# Patient Record
Sex: FEMALE | Race: OTHER | NOT HISPANIC OR LATINO
[De-identification: names, ages, dates, MRNs, and addresses within clinical notes are randomized per-mention and may not be internally consistent; named-entity substitution may affect disease eponyms.]

---

## 2019-11-01 PROBLEM — Z00.00 ENCOUNTER FOR PREVENTIVE HEALTH EXAMINATION: Status: ACTIVE | Noted: 2019-11-01

## 2019-11-18 ENCOUNTER — APPOINTMENT (OUTPATIENT)
Dept: OBGYN | Facility: CLINIC | Age: 49
End: 2019-11-18
Payer: MEDICAID

## 2019-11-18 VITALS
BODY MASS INDEX: 27.95 KG/M2 | DIASTOLIC BLOOD PRESSURE: 78 MMHG | WEIGHT: 142.38 LBS | HEIGHT: 60 IN | SYSTOLIC BLOOD PRESSURE: 117 MMHG

## 2019-11-18 DIAGNOSIS — Z72.3 LACK OF PHYSICAL EXERCISE: ICD-10-CM

## 2019-11-18 DIAGNOSIS — N92.0 EXCESSIVE AND FREQUENT MENSTRUATION WITH REGULAR CYCLE: ICD-10-CM

## 2019-11-18 DIAGNOSIS — Z78.9 OTHER SPECIFIED HEALTH STATUS: ICD-10-CM

## 2019-11-18 DIAGNOSIS — Z83.3 FAMILY HISTORY OF DIABETES MELLITUS: ICD-10-CM

## 2019-11-18 DIAGNOSIS — Z01.419 ENCOUNTER FOR GYNECOLOGICAL EXAMINATION (GENERAL) (ROUTINE) W/OUT ABNORMAL FINDINGS: ICD-10-CM

## 2019-11-18 DIAGNOSIS — Z82.49 FAMILY HISTORY OF ISCHEMIC HEART DISEASE AND OTHER DISEASES OF THE CIRCULATORY SYSTEM: ICD-10-CM

## 2019-11-18 PROCEDURE — 99213 OFFICE O/P EST LOW 20 MIN: CPT | Mod: 25

## 2019-11-18 PROCEDURE — 99396 PREV VISIT EST AGE 40-64: CPT

## 2019-11-18 NOTE — HISTORY OF PRESENT ILLNESS
[1 Year Ago] : 1 year ago [Good] : being in good health [Healthy Diet] : a healthy diet [Last Colonoscopy ___] : Last colonoscopy [unfilled] [Last Mammogram ___] : Last Mammogram was [unfilled] [Last Pap ___] : Last cervical pap smear was [unfilled] [Reproductive Age] : is of reproductive age [Contraception] : uses contraception [Tubal Occlusion] : has had a tubal occlusion [Normal Amount/Duration] : was of a normal amount and duration [Definite:  ___ (Date)] : the last menstrual period was [unfilled] [Regular Cycle Intervals] : periods have been regular [Frequency: Q ___ days] : menstrual periods occur approximately every [unfilled] days [Sexually Active] : is sexually active [Monogamous] : is monogamous [Male ___] : [unfilled] male [Menarche Age: ____] : age at menarche was [unfilled] [Menstrual Problems] : reports normal menses [HIV] : HIV - [HSV] : HSV - [RPR] : RPR - [Hepatitis] : Hepatitis - [Chlamydia] : Chlamydia - [Gonorrhea] : Gonorrhea - [HPV] : HPV - [Spotting Between  Menses] : no spotting between menses [Menstrual Cramps] : no menstrual cramps [de-identified] :  x 6 months

## 2019-11-18 NOTE — CHIEF COMPLAINT
[Annual Visit] : annual visit [FreeTextEntry1] : 47 yo P 3003 LMP 10/26/2019 presents for annual Gyn. She has no complaints.

## 2019-11-18 NOTE — PHYSICAL EXAM
[Awake] : awake [Alert] : alert [Soft] : soft [Oriented x3] : oriented to person, place, and time [Normal] : cervix [No Bleeding] : there was no active vaginal bleeding [Acute Distress] : no acute distress [Mass] : no breast mass [Nipple Discharge] : no nipple discharge [Axillary LAD] : no axillary lymphadenopathy [Tender] : non tender [FreeTextEntry7] : suboptimal secondary to body habitus

## 2019-11-18 NOTE — REVIEW OF SYSTEMS
[Nl] : Integumentary [FreeTextEntry1] : Patient reports for past 8 years since birth of last child every time she cough t she urinates involuntarily

## 2019-11-22 LAB
APPEARANCE: CLEAR
BACTERIA UR CULT: NORMAL
BACTERIA: ABNORMAL
BILIRUBIN URINE: NEGATIVE
BLOOD URINE: NORMAL
C TRACH RRNA SPEC QL NAA+PROBE: NOT DETECTED
COLOR: YELLOW
GLUCOSE QUALITATIVE U: NEGATIVE
HPV HIGH+LOW RISK DNA PNL CVX: NOT DETECTED
HYALINE CASTS: 0 /LPF
KETONES URINE: NEGATIVE
LEUKOCYTE ESTERASE URINE: NEGATIVE
MICROSCOPIC-UA: NORMAL
N GONORRHOEA RRNA SPEC QL NAA+PROBE: NOT DETECTED
NITRITE URINE: NEGATIVE
PH URINE: 6
PROTEIN URINE: NORMAL
RED BLOOD CELLS URINE: 8 /HPF
SOURCE TP AMPLIFICATION: NORMAL
SPECIFIC GRAVITY URINE: 1.02
SQUAMOUS EPITHELIAL CELLS: 4 /HPF
UROBILINOGEN URINE: NORMAL
WHITE BLOOD CELLS URINE: 1 /HPF

## 2019-12-03 ENCOUNTER — ASOB RESULT (OUTPATIENT)
Age: 49
End: 2019-12-03

## 2019-12-03 ENCOUNTER — APPOINTMENT (OUTPATIENT)
Dept: ANTEPARTUM | Facility: CLINIC | Age: 49
End: 2019-12-03
Payer: MEDICAID

## 2019-12-03 PROCEDURE — 76830 TRANSVAGINAL US NON-OB: CPT

## 2019-12-03 PROCEDURE — 76856 US EXAM PELVIC COMPLETE: CPT | Mod: 59

## 2019-12-05 LAB — CYTOLOGY CVX/VAG DOC THIN PREP: NORMAL

## 2020-01-17 ENCOUNTER — APPOINTMENT (OUTPATIENT)
Dept: UROGYNECOLOGY | Facility: CLINIC | Age: 50
End: 2020-01-17
Payer: MEDICAID

## 2020-01-17 VITALS — SYSTOLIC BLOOD PRESSURE: 114 MMHG | DIASTOLIC BLOOD PRESSURE: 75 MMHG | HEIGHT: 60 IN

## 2020-01-17 LAB
BILIRUB UR QL STRIP: NEGATIVE
CLARITY UR: CLEAR
COLLECTION METHOD: NORMAL
GLUCOSE UR-MCNC: NEGATIVE
HCG UR QL: 0.2 EU/DL
HGB UR QL STRIP.AUTO: NORMAL
KETONES UR-MCNC: NEGATIVE
LEUKOCYTE ESTERASE UR QL STRIP: NEGATIVE
NITRITE UR QL STRIP: NEGATIVE
PH UR STRIP: 5.5
PROT UR STRIP-MCNC: NEGATIVE
SP GR UR STRIP: 1.03

## 2020-01-17 PROCEDURE — 99204 OFFICE O/P NEW MOD 45 MIN: CPT | Mod: 25

## 2020-01-17 PROCEDURE — 51701 INSERT BLADDER CATHETER: CPT

## 2020-01-17 PROCEDURE — 81003 URINALYSIS AUTO W/O SCOPE: CPT | Mod: QW

## 2020-01-17 NOTE — PROCEDURE
[Straight Catheterization] : insertion of a straight catheter [Stress Incontinence] : stress incontinence [Urgent Incontinence] : urgent incontinence [Urinary Frequency] : urinary frequency [Patient] : the patient [___ Fr Straight Tip] : a [unfilled] in Mongolian straight tip catheter [Clear] : clear [None] : there were no complications with the catheter insertion [No Complications] : no complications [Tolerated Well] : the patient tolerated the procedure well [Post procedure instructions and information given] : Post procedure instructions and information were given and reviewed with patient. [1] : 1 [FreeTextEntry1] : cathed for PVR and uncontaminated specimen

## 2020-01-17 NOTE — PROCEDURE
[Straight Catheterization] : insertion of a straight catheter [Stress Incontinence] : stress incontinence [Urgent Incontinence] : urgent incontinence [Urinary Frequency] : urinary frequency [Patient] : the patient [___ Fr Straight Tip] : a [unfilled] in Senegalese straight tip catheter [None] : there were no complications with the catheter insertion [No Complications] : no complications [Clear] : clear [1] : 1 [Post procedure instructions and information given] : Post procedure instructions and information were given and reviewed with patient. [Tolerated Well] : the patient tolerated the procedure well [FreeTextEntry1] : cathed for PVR and uncontaminated specimen

## 2020-01-17 NOTE — ASSESSMENT
[FreeTextEntry1] : Citlaly is a pleasant premenopausal P3, PSHx includes HSC tubal occlusion, presents with 8 years of bothersome ADITHYA and OAB-dry less bothersome. On exam, her empty supine CST was neg, however she had positive urethral hypermobility. Her straight-cath PVR volume was normal and was sent for UA, UCx. On pelvic exam, there were no appreciable masses or lesions. Pelvic floor muscle contraction strength was present but weak. There was no levator or pelvic floor musculature banding, tightness, or tenderness. POPQ exam did not demonstrate clinically significant pelvic organ prolapse.\par \par The patient has symptoms consistent with stress urinary incontinence. The etiology of ADTIHYA was discussed. Management options including observation, behavioral modifications, medication, pessary, Impressa insert, periurethral bulking via cystoscopy, and surgery with midurethral sling were reviewed.\par \par The patient has urinary symptoms consistent with overactive bladder. The etiology of OAB was discussed. Management options including observation, behavioral modifications (dietary changes, monitoring fluid intake, bladder training, timed voids, use of pads/protective garments), kegels, PT, medications, PTNS, SNS, and bladder Botox were all reviewed.\par \par She is interested in MUS and will RTO for UDS to eval for CST/GSUI in setting of neg CST with symptoms in office, desiring procedural intervention. All ques answered.\par \par Plan:\par [] f/u UA, UCx to eval for MH\par [] UDS\par [] surg disc, anticipate booking TVT MUS / cysto

## 2020-01-17 NOTE — ASSESSMENT
[FreeTextEntry1] : Citlaly is a pleasant premenopausal P3, PSHx includes HSC tubal occlusion, presents with 8 years of bothersome ADITHYA and OAB-dry less bothersome. On exam, her empty supine CST was neg, however she had positive urethral hypermobility. Her straight-cath PVR volume was normal and was sent for UA, UCx. On pelvic exam, there were no appreciable masses or lesions. Pelvic floor muscle contraction strength was present but weak. There was no levator or pelvic floor musculature banding, tightness, or tenderness. POPQ exam did not demonstrate clinically significant pelvic organ prolapse.\par \par The patient has symptoms consistent with stress urinary incontinence. The etiology of ADITHYA was discussed. Management options including observation, behavioral modifications, medication, pessary, Impressa insert, periurethral bulking via cystoscopy, and surgery with midurethral sling were reviewed.\par \par The patient has urinary symptoms consistent with overactive bladder. The etiology of OAB was discussed. Management options including observation, behavioral modifications (dietary changes, monitoring fluid intake, bladder training, timed voids, use of pads/protective garments), kegels, PT, medications, PTNS, SNS, and bladder Botox were all reviewed.\par \par She is interested in MUS and will RTO for UDS to eval for CST/GSUI in setting of neg CST with symptoms in office, desiring procedural intervention. All ques answered.\par \par Plan:\par [] f/u UA, UCx to eval for MH\par [] UDS\par [] surg disc, anticipate booking TVT MUS / cysto

## 2020-01-17 NOTE — OB HISTORY
[Vaginal ___] : [unfilled] vaginal delivery(s) [Definite ___ (Date)] : the last menstrual period was [unfilled] [Sexually Active] : sexually active [FreeTextEntry1] : largest baby 5 lbs 11 oz

## 2020-01-17 NOTE — PHYSICAL EXAM
[No Acute Distress] : in no acute distress [Oriented x3] : oriented to person, place, and time [No Edema] : ~T edema was not present [Symmetrical] : the neck was ~L symmetrical [Soft, Nontender] : the abdomen was soft and nontender [None] : no CVA tenderness [Warm and Dry] : was warm and dry to touch [Normal Gait] : gait was normal [Labia Majora] : were normal [Labia Minora] : were normal [Bartholin's Gland] : both Bartholin's glands were normal  [Exam Deferred] : was deferred [Normal Appearance] : general appearance was normal [No Bleeding] : there was no active vaginal bleeding [2] : 2 [Aa ____] : Aa [unfilled] [Ba ____] : Ba [unfilled] [GH ____] : GH [unfilled] [C ____] : C [unfilled] [PB ____] : PB [unfilled] [TVL ____] : TVL  [unfilled] [Ap ____] : Ap [unfilled] [Bp ____] : Bp [unfilled] [D ____] : D [unfilled] [] : 0 [Normal] : normal [Soft] :  the cervix was soft [Post Void Residual ____ml] : post void residual via catheterization was [unfilled] ml [Distended] : not distended [Tenderness] : ~T no ~M abdominal tenderness observed [Cough] : no cough [FreeTextEntry3] : CST neg, +urethral hypermobility; uroflow low vol void for interpretation [Inguinal LAD] : no adenopathy was noted in the inguinal lymph nodes [FreeTextEntry4] : no cyst, no lesion, no mass, no suburethral diverticulum [de-identified] : no appreciable masses or tenderness, no CMT

## 2020-01-17 NOTE — PHYSICAL EXAM
[No Acute Distress] : in no acute distress [Oriented x3] : oriented to person, place, and time [No Edema] : ~T edema was not present [Symmetrical] : the neck was ~L symmetrical [None] : no CVA tenderness [Soft, Nontender] : the abdomen was soft and nontender [Normal Gait] : gait was normal [Warm and Dry] : was warm and dry to touch [Labia Majora] : were normal [Bartholin's Gland] : both Bartholin's glands were normal  [Labia Minora] : were normal [Exam Deferred] : was deferred [Normal Appearance] : general appearance was normal [No Bleeding] : there was no active vaginal bleeding [2] : 2 [Aa ____] : Aa [unfilled] [Ba ____] : Ba [unfilled] [C ____] : C [unfilled] [GH ____] : GH [unfilled] [PB ____] : PB [unfilled] [TVL ____] : TVL  [unfilled] [Ap ____] : Ap [unfilled] [Bp ____] : Bp [unfilled] [D ____] : D [unfilled] [] : 0 [Soft] :  the cervix was soft [Normal] : normal [Post Void Residual ____ml] : post void residual via catheterization was [unfilled] ml [Tenderness] : ~T no ~M abdominal tenderness observed [Distended] : not distended [Cough] : no cough [Inguinal LAD] : no adenopathy was noted in the inguinal lymph nodes [FreeTextEntry3] : CST neg, +urethral hypermobility; uroflow low vol void for interpretation [FreeTextEntry4] : no cyst, no lesion, no mass, no suburethral diverticulum [de-identified] : no appreciable masses or tenderness, no CMT

## 2020-01-17 NOTE — HISTORY OF PRESENT ILLNESS
[FreeTextEntry1] : Botehrsome leakage of urine with cough sneeze activity, not with urgency for 8 years since birth of final child 8 years ago. Daytime freq of urination, urgency, nocturia 2. No dysuria, no UTIs, no gross hematuria, no flank pain, no incomplete emptying of bladder. No bulge or pressure in vagina. Normal bowel movements without straining. No intervention for these urinary issues as of yet. Sexually active. Premenopausal. Using liners, bothered by incontinence.\par \par Dec 2019 GC/CT neg, pap/HPV neg\par Dec 2019 TVUS heterogeneous ut 9 x 6 x 5 cm with 2 x 1-2cm fibroids (fundal and poster), EE 6 mm, OVs normal, L Ov 3 cm simple cyst, no adnexal masses, no FF.\par \par BMI 29

## 2020-01-17 NOTE — CHIEF COMPLAINT
[Questionnaire Received] : Patient questionnaire received [Poor Bladder Control] : poor bladder control [Urine Frequency] : urine frequency [Pelvic Pain] : pelvic pain

## 2020-01-20 ENCOUNTER — RESULT REVIEW (OUTPATIENT)
Age: 50
End: 2020-01-20

## 2020-01-20 LAB
APPEARANCE: CLEAR
BACTERIA UR CULT: NORMAL
BACTERIA: NEGATIVE
BILIRUBIN URINE: NEGATIVE
BLOOD URINE: NORMAL
COLOR: YELLOW
GLUCOSE QUALITATIVE U: NEGATIVE
HYALINE CASTS: 4 /LPF
KETONES URINE: NEGATIVE
LEUKOCYTE ESTERASE URINE: NEGATIVE
MICROSCOPIC-UA: NORMAL
NITRITE URINE: NEGATIVE
PH URINE: 5.5
PROTEIN URINE: NEGATIVE
RED BLOOD CELLS URINE: 4 /HPF
SPECIFIC GRAVITY URINE: 1.02
SQUAMOUS EPITHELIAL CELLS: 7 /HPF
UROBILINOGEN URINE: NORMAL
WHITE BLOOD CELLS URINE: 1 /HPF

## 2020-02-03 ENCOUNTER — APPOINTMENT (OUTPATIENT)
Dept: UROGYNECOLOGY | Facility: CLINIC | Age: 50
End: 2020-02-03
Payer: MEDICAID

## 2020-02-03 PROCEDURE — 51729 CYSTOMETROGRAM W/VP&UP: CPT

## 2020-02-03 PROCEDURE — 51741 ELECTRO-UROFLOWMETRY FIRST: CPT

## 2020-02-03 PROCEDURE — 51784 ANAL/URINARY MUSCLE STUDY: CPT

## 2020-02-03 PROCEDURE — 51797 INTRAABDOMINAL PRESSURE TEST: CPT

## 2020-02-26 ENCOUNTER — APPOINTMENT (OUTPATIENT)
Dept: UROGYNECOLOGY | Facility: CLINIC | Age: 50
End: 2020-02-26

## 2020-03-03 ENCOUNTER — APPOINTMENT (OUTPATIENT)
Dept: UROGYNECOLOGY | Facility: CLINIC | Age: 50
End: 2020-03-03
Payer: MEDICAID

## 2020-03-03 DIAGNOSIS — R31.29 OTHER MICROSCOPIC HEMATURIA: ICD-10-CM

## 2020-03-03 DIAGNOSIS — R32 UNSPECIFIED URINARY INCONTINENCE: ICD-10-CM

## 2020-03-03 DIAGNOSIS — R35.0 FREQUENCY OF MICTURITION: ICD-10-CM

## 2020-03-03 DIAGNOSIS — N36.41 HYPERMOBILITY OF URETHRA: ICD-10-CM

## 2020-03-03 DIAGNOSIS — R35.1 NOCTURIA: ICD-10-CM

## 2020-03-03 DIAGNOSIS — R31.9 HEMATURIA, UNSPECIFIED: ICD-10-CM

## 2020-03-03 DIAGNOSIS — R39.15 URGENCY OF URINATION: ICD-10-CM

## 2020-03-03 PROCEDURE — 52000 CYSTOURETHROSCOPY: CPT

## 2020-03-03 PROCEDURE — 99213 OFFICE O/P EST LOW 20 MIN: CPT | Mod: 25

## 2020-08-21 ENCOUNTER — EMERGENCY (EMERGENCY)
Facility: HOSPITAL | Age: 50
LOS: 1 days | Discharge: HOME | End: 2020-08-21
Attending: EMERGENCY MEDICINE
Payer: MEDICAID

## 2020-08-21 VITALS
DIASTOLIC BLOOD PRESSURE: 86 MMHG | HEART RATE: 94 BPM | SYSTOLIC BLOOD PRESSURE: 132 MMHG | WEIGHT: 139.99 LBS | TEMPERATURE: 99 F | HEIGHT: 60 IN | RESPIRATION RATE: 18 BRPM | OXYGEN SATURATION: 97 %

## 2020-08-21 PROCEDURE — 99283 EMERGENCY DEPT VISIT LOW MDM: CPT

## 2020-08-21 PROCEDURE — 73630 X-RAY EXAM OF FOOT: CPT | Mod: 26,LT

## 2020-08-21 PROCEDURE — 73630 X-RAY EXAM OF FOOT: CPT

## 2020-08-21 RX ORDER — ACETAMINOPHEN 500 MG
975 TABLET ORAL ONCE
Refills: 0 | Status: COMPLETED | OUTPATIENT
Start: 2020-08-21 | End: 2020-08-21

## 2020-08-21 RX ADMIN — Medication 975 MILLIGRAM(S): at 14:46

## 2020-08-21 NOTE — ED PROVIDER NOTE - ATTENDING CONTRIBUTION TO CARE
Sarahy: I performed a face to face bedside interview with patient regarding history of present illness, review of symptoms and past medical history. I completed an independent physical exam.  I have discussed patient's plan of care with advanced care provider.   I agree with note as stated above including HISTORY OF PRESENT ILLNESS, HIV, PAST MEDICAL/SURGICAL/FAMILY/SOCIAL HISTORY, ALLERGIES AND HOME MEDICATIONS, REVIEW OF SYSTEMS, PHYSICAL EXAM, MEDICAL DECISION MAKING and any PROGRESS NOTES during the time I functioned as the attending physician for this patient  unless otherwise noted. My brief assessment is as follows: patient with foot pain after slipping down one step, able to ambulate. +ttp to left 4th/5th metatarsal. Xray with no acute fx. Podiatry f/u given.

## 2020-08-21 NOTE — ED ADULT NURSE NOTE - CHIEF COMPLAINT QUOTE
Patient arrived to ED today with c/o left leg pain after mechanical fall at home yesterday. Seen and discharged per pa

## 2020-08-21 NOTE — ED PROVIDER NOTE - OBJECTIVE STATEMENT
This is a 49 year old female with c/o L foot pain x 1 day.  She reports slipped down 1 step yesterday.  She has been walking since injury, applying most of the pressure on her heel.  She notes no prior injury to L foot in past.  She denies taking any pain medication.  She reports no allergies.

## 2020-08-21 NOTE — ED PROVIDER NOTE - PATIENT PORTAL LINK FT
You can access the FollowMyHealth Patient Portal offered by [facility] by registering at the following website: [email or web address]. By joining "[employer]’s FollowMyHealth portal, you will also be able to view your health information using other applications (apps) compatible with our system.

## 2020-08-21 NOTE — ED PROVIDER NOTE - PHYSICAL EXAMINATION
Constitutional - well-developed; well nourished. Head - NCAT. Airway patent. Eyes - PERRL. CV - RRR. no murmur. no edema. Pulm - CTAB. Abd - soft, nt. no rebound. no guarding. Neuro - A&Ox3. strength 5/5 x4. sensation intact x4. normal gait. Skin - No rash. MSK -+TTP along L base of 5th metatarsal, no echymosis, no erythema, no warmth, no swelling, +FROM, motor and sensory sensation intact, DP and PT pulses intact, calf soft NT ND.

## 2020-08-21 NOTE — ED PROVIDER NOTE - CARE PROVIDER_API CALL
Porfirio, Peter J  PODIATRIC MEDICINE AND SURGERY  [address] [place] [address]  Phone: [phone]  Fax: [phone]  Follow Up Time:

## 2020-08-21 NOTE — ED PROVIDER NOTE - NS ED ROS FT
No fever/chills, No photophobia/eye pain/changes in vision, No ear pain/sore throat/dysphagia, No chest pain/palpitations, no SOB/cough/wheeze/stridor, No abdominal pain, No N/V/D, no dysuria/frequency/discharge, +L foot pain, No neck/back pain, no rash, no changes in neurological status/function.

## 2020-12-21 PROBLEM — Z01.419 ENCOUNTER FOR GYNECOLOGICAL EXAMINATION WITH PAPANICOLAOU SMEAR OF CERVIX: Status: RESOLVED | Noted: 2019-11-18 | Resolved: 2020-12-21

## 2022-06-22 NOTE — ED ADULT NURSE NOTE - ISOLATION TYPE:
Continue ozempic as it has helped glycemic control  declined increased dose for weight due to constipation   Continue otc miralax None

## 2022-11-08 ENCOUNTER — OFFICE (OUTPATIENT)
Dept: URBAN - METROPOLITAN AREA CLINIC 94 | Facility: CLINIC | Age: 52
Setting detail: OPHTHALMOLOGY
End: 2022-11-08
Payer: MEDICAID

## 2022-11-08 DIAGNOSIS — H43.393: ICD-10-CM

## 2022-11-08 DIAGNOSIS — H04.123: ICD-10-CM

## 2022-11-08 DIAGNOSIS — H40.033: ICD-10-CM

## 2022-11-08 DIAGNOSIS — H25.13: ICD-10-CM

## 2022-11-08 PROCEDURE — 83861 MICROFLUID ANALY TEARS: CPT | Performed by: OPHTHALMOLOGY

## 2022-11-08 PROCEDURE — 92012 INTRM OPH EXAM EST PATIENT: CPT | Performed by: OPHTHALMOLOGY

## 2022-11-08 ASSESSMENT — REFRACTION_MANIFEST
OS_VA2: 20/20(J1+)
OD_CYLINDER: -0.50
OD_ADD: +1.75
OD_VA2: 20/20(J1+)
OD_SPHERE: +1.25
OU_VA: 20/20
OS_SPHERE: +0.75
OS_ADD: +1.75
OD_AXIS: 20
OS_VA1: 20/20
OD_VA1: 20/20

## 2022-11-08 ASSESSMENT — KERATOMETRY
OS_K2POWER_DIOPTERS: 46.00
OS_K1POWER_DIOPTERS: 45.75
OS_AXISANGLE_DEGREES: 062
OD_K2POWER_DIOPTERS: 46.50
OD_AXISANGLE_DEGREES: 118
OD_K1POWER_DIOPTERS: 46.00
METHOD_AUTO_MANUAL: AUTO

## 2022-11-08 ASSESSMENT — REFRACTION_CURRENTRX
OS_SPHERE: +1.75
OD_SPHERE: +2.00
OS_CYLINDER: 0.00
OD_AXIS: 038
OD_CYLINDER: -0.25
OS_VPRISM_DIRECTION: SV
OD_OVR_VA: 20/
OD_VPRISM_DIRECTION: SV
OS_OVR_VA: 20/
OS_AXIS: 0

## 2022-11-08 ASSESSMENT — AXIALLENGTH_DERIVED
OS_AL: 22.1767
OD_AL: 22.2706
OD_AL: 22.1412

## 2022-11-08 ASSESSMENT — SPHEQUIV_DERIVED
OD_SPHEQUIV: 1.375
OD_SPHEQUIV: 1
OS_SPHEQUIV: 1.625

## 2022-11-08 ASSESSMENT — CONFRONTATIONAL VISUAL FIELD TEST (CVF)
OD_FINDINGS: FULL
OS_FINDINGS: FULL

## 2022-11-08 ASSESSMENT — REFRACTION_AUTOREFRACTION
OS_CYLINDER: -0.25
OD_CYLINDER: -0.25
OD_SPHERE: +1.50
OD_AXIS: 039
OS_AXIS: 097
OS_SPHERE: +1.75

## 2022-11-08 ASSESSMENT — TONOMETRY
OS_IOP_MMHG: 12
OD_IOP_MMHG: 13

## 2022-11-08 ASSESSMENT — VISUAL ACUITY
OD_BCVA: 20/25-1
OS_BCVA: 20/20-1

## 2023-05-09 ENCOUNTER — OFFICE (OUTPATIENT)
Dept: URBAN - METROPOLITAN AREA CLINIC 94 | Facility: CLINIC | Age: 53
Setting detail: OPHTHALMOLOGY
End: 2023-05-09
Payer: MEDICAID

## 2023-05-09 DIAGNOSIS — H25.13: ICD-10-CM

## 2023-05-09 DIAGNOSIS — H43.393: ICD-10-CM

## 2023-05-09 DIAGNOSIS — H40.033: ICD-10-CM

## 2023-05-09 PROCEDURE — 92012 INTRM OPH EXAM EST PATIENT: CPT | Performed by: OPHTHALMOLOGY

## 2023-05-09 ASSESSMENT — REFRACTION_CURRENTRX
OS_VPRISM_DIRECTION: SV
OS_CYLINDER: 0.00
OS_OVR_VA: 20/
OD_AXIS: 038
OS_SPHERE: +1.75
OS_AXIS: 0
OD_CYLINDER: -0.25
OD_SPHERE: +2.00
OD_VPRISM_DIRECTION: SV
OD_OVR_VA: 20/

## 2023-05-09 ASSESSMENT — SPHEQUIV_DERIVED
OS_SPHEQUIV: 1.125
OD_SPHEQUIV: 1
OD_SPHEQUIV: 0.875

## 2023-05-09 ASSESSMENT — REFRACTION_AUTOREFRACTION
OD_AXIS: 020
OS_AXIS: 040
OS_CYLINDER: -0.25
OD_CYLINDER: -0.75
OS_SPHERE: +1.25
OD_SPHERE: +1.25

## 2023-05-09 ASSESSMENT — KERATOMETRY
OS_K1POWER_DIOPTERS: 45.75
METHOD_AUTO_MANUAL: AUTO
OD_K2POWER_DIOPTERS: 49.25
OS_K2POWER_DIOPTERS: 46.00
OD_K1POWER_DIOPTERS: 46.25
OD_AXISANGLE_DEGREES: 102
OS_AXISANGLE_DEGREES: 179

## 2023-05-09 ASSESSMENT — TONOMETRY
OD_IOP_MMHG: 14
OS_IOP_MMHG: 12

## 2023-05-09 ASSESSMENT — REFRACTION_MANIFEST
OD_AXIS: 20
OD_VA2: 20/20(J1+)
OS_VA1: 20/20
OD_CYLINDER: -0.50
OS_VA2: 20/20(J1+)
OU_VA: 20/20
OD_SPHERE: +1.25
OS_ADD: +1.75
OD_VA1: 20/20
OD_ADD: +1.75
OS_SPHERE: +0.75

## 2023-05-09 ASSESSMENT — CONFRONTATIONAL VISUAL FIELD TEST (CVF)
OS_FINDINGS: FULL
OD_FINDINGS: FULL

## 2023-05-09 ASSESSMENT — VISUAL ACUITY
OS_BCVA: 20/25
OD_BCVA: 20/25-1

## 2023-05-09 ASSESSMENT — AXIALLENGTH_DERIVED
OD_AL: 21.7902
OD_AL: 21.8318
OS_AL: 22.3502

## 2023-06-05 ENCOUNTER — APPOINTMENT (OUTPATIENT)
Dept: CARDIOLOGY | Facility: CLINIC | Age: 53
End: 2023-06-05
Payer: MEDICAID

## 2023-06-05 ENCOUNTER — NON-APPOINTMENT (OUTPATIENT)
Age: 53
End: 2023-06-05

## 2023-06-05 VITALS
OXYGEN SATURATION: 97 % | DIASTOLIC BLOOD PRESSURE: 87 MMHG | TEMPERATURE: 98.2 F | SYSTOLIC BLOOD PRESSURE: 135 MMHG | HEART RATE: 79 BPM | BODY MASS INDEX: 28.12 KG/M2 | WEIGHT: 144 LBS

## 2023-06-05 DIAGNOSIS — R53.83 OTHER FATIGUE: ICD-10-CM

## 2023-06-05 PROCEDURE — 93000 ELECTROCARDIOGRAM COMPLETE: CPT

## 2023-06-05 PROCEDURE — 99203 OFFICE O/P NEW LOW 30 MIN: CPT | Mod: 25

## 2023-06-05 RX ORDER — BUDESONIDE AND FORMOTEROL FUMARATE DIHYDRATE 160; 4.5 UG/1; UG/1
160-4.5 AEROSOL RESPIRATORY (INHALATION) TWICE DAILY
Refills: 0 | Status: ACTIVE | COMMUNITY

## 2023-06-05 RX ORDER — ALOGLIPTIN 12.5 MG/1
12.5 TABLET, FILM COATED ORAL DAILY
Refills: 0 | Status: ACTIVE | COMMUNITY

## 2023-06-05 RX ORDER — ALBUTEROL SULFATE 2.5 MG/3ML
(2.5 MG/3ML) SOLUTION RESPIRATORY (INHALATION) DAILY
Refills: 0 | Status: ACTIVE | COMMUNITY

## 2023-06-05 RX ORDER — MONTELUKAST SODIUM 10 MG/1
10 TABLET, FILM COATED ORAL DAILY
Refills: 0 | Status: ACTIVE | COMMUNITY

## 2023-06-05 RX ORDER — PHENAZOPYRIDINE HYDROCHLORIDE 200 MG/1
200 TABLET ORAL
Qty: 6 | Refills: 0 | Status: DISCONTINUED | COMMUNITY
Start: 2020-02-03 | End: 2023-06-05

## 2023-06-08 ENCOUNTER — NON-APPOINTMENT (OUTPATIENT)
Age: 53
End: 2023-06-08

## 2023-06-12 NOTE — ASSESSMENT
[FreeTextEntry1] : EKG: June 5, 2020:Sinus  Rhythm \par Low voltage in precordial leads. \par  -Consider Anterior infarct -age undetermined, versus, lead placement.\par \par ABNORMAL \par \par CT scan of chest done on June 1, 2023-please refer to report for details.  Mildly enlarged heart size, stable small right pericardial cyst measuring about 18 mm.  Large left thyroid mass/nodule with substernal extension causing tracheal deviation to right.\par \par Assessment:\par 1.  Chest pain\par 2.  Abnormal EKG\par 3.  Type 2 diabetes mellitus\par 4.  Large left thyroid mass\par 5.  Pericardial cyst\par \par Recommendations:\par 1.  Echocardiogram and regular stress\par 2.  Follow-up in 2-month

## 2023-06-12 NOTE — HISTORY OF PRESENT ILLNESS
[FreeTextEntry1] : HPI: Patient is a 52-year-old female with a history of for type 2 diabetes mellitus, presents for evaluation of chest pain.  Patient had 1 episode of chest pain, she has not had a recurrent chest pain.  Patient was seen by her primary care physician and apparently she has a mass in the chest noted on the chest CT scan.  Patient is physically active denies any exertional dyspnea palpitations syncope.\par \par \par \par \par PMH: Type 2 diabetes mellitus, no hypertension no prior CAD or CHF.\par \par Social History: Non-smoker.  Denies any alcohol or substance abuse\par \par Family history: Noncontributory\par \par \par

## 2023-06-24 ENCOUNTER — APPOINTMENT (OUTPATIENT)
Dept: CARDIOLOGY | Facility: CLINIC | Age: 53
End: 2023-06-24
Payer: MEDICAID

## 2023-06-24 PROCEDURE — 93306 TTE W/DOPPLER COMPLETE: CPT

## 2023-07-06 ENCOUNTER — NON-APPOINTMENT (OUTPATIENT)
Age: 53
End: 2023-07-06

## 2023-07-27 ENCOUNTER — APPOINTMENT (OUTPATIENT)
Dept: CARDIOLOGY | Facility: CLINIC | Age: 53
End: 2023-07-27
Payer: MEDICAID

## 2023-07-27 PROCEDURE — 93015 CV STRESS TEST SUPVJ I&R: CPT

## 2023-08-18 ENCOUNTER — APPOINTMENT (OUTPATIENT)
Dept: CARDIOLOGY | Facility: CLINIC | Age: 53
End: 2023-08-18
Payer: MEDICAID

## 2023-08-18 VITALS
HEART RATE: 83 BPM | DIASTOLIC BLOOD PRESSURE: 64 MMHG | BODY MASS INDEX: 28.12 KG/M2 | WEIGHT: 144 LBS | SYSTOLIC BLOOD PRESSURE: 120 MMHG | OXYGEN SATURATION: 99 %

## 2023-08-18 DIAGNOSIS — R94.39 ABNORMAL RESULT OF OTHER CARDIOVASCULAR FUNCTION STUDY: ICD-10-CM

## 2023-08-18 DIAGNOSIS — R07.9 CHEST PAIN, UNSPECIFIED: ICD-10-CM

## 2023-08-18 PROCEDURE — 93000 ELECTROCARDIOGRAM COMPLETE: CPT

## 2023-08-18 PROCEDURE — 99213 OFFICE O/P EST LOW 20 MIN: CPT | Mod: 25

## 2023-08-18 RX ORDER — CHROMIUM 200 MCG
1000 TABLET ORAL DAILY
Refills: 0 | Status: DISCONTINUED | COMMUNITY
End: 2023-08-18

## 2023-08-18 NOTE — HISTORY OF PRESENT ILLNESS
[FreeTextEntry1] : HPI: Patient is a 52-year-old female with a history of for type 2 diabetes mellitus, presents for evaluation of chest pain.  Patient had 1 episode of chest pain, she has not had a recurrent chest pain.  Patient was seen by her primary care physician and apparently she has a mass in the chest noted on the chest CT scan.    Today, patient presents for a follow-up visit.  Since last visit she had an echocardiogram, regular stress test.  Patient had an abnormal regular stress test and it was discussed with the patient, she was recommended to have a nuclear stress test, patient was out of town she did not get a chance to make the appointment for a nuclear stress test.  Patient is feeling fine. Patient is physically active denies any exertional dyspnea palpitations syncope.  Patient is consulting the ENT surgeon for that thyroid mass.     PMH: Type 2 diabetes mellitus, no hypertension no prior CAD or CHF.  Social History: Non-smoker.  Denies any alcohol or substance abuse  Family history: Noncontributory

## 2023-08-18 NOTE — REVIEW OF SYSTEMS
Patient arrived to room in stable condition. Transferred from wheelchair to bed without difficulty. Oriented to room. IPOCs initiated.   [Negative] : Cardiovascular

## 2023-08-18 NOTE — ASSESSMENT
[FreeTextEntry1] : EKG: June 5, 2020:Sinus  Rhythm  Low voltage in precordial leads.   -Consider Anterior infarct -age undetermined, versus, lead placement.  ABNORMAL   CT scan of chest done on June 1, 2023-please refer to report for details.  Mildly enlarged heart size, stable small right pericardial cyst measuring about 18 mm.  Large left thyroid mass/nodule with substernal extension causing tracheal deviation to right.  Echocardiogram June 24, 2023: LVEF 55 to 60%.  Normal RV size and function.  Normal echo. Regular stress test: July 27, 2023: Positive stress test by ECG criteria at 111% of MPHR.  Patient exercised for 9 minutes.  Assessment: 1.  Chest pain-abnormal regular stress test 2.  Abnormal EKG 3.  Type 2 diabetes mellitus 4.  Large left thyroid mass 5.  Pericardial cyst  Recommendations: Results of echocardiogram and regular stress test discussed with the patient again 1.  Patient strongly encouraged to make an appointment for nuclear stress test 2.  Follow-up in 2 months

## 2023-09-26 ENCOUNTER — APPOINTMENT (OUTPATIENT)
Dept: CARDIOLOGY | Facility: CLINIC | Age: 53
End: 2023-09-26
Payer: MEDICAID

## 2023-09-26 PROCEDURE — 78452 HT MUSCLE IMAGE SPECT MULT: CPT

## 2023-09-26 PROCEDURE — A9500: CPT

## 2023-09-26 PROCEDURE — 93015 CV STRESS TEST SUPVJ I&R: CPT

## 2023-09-28 RX ORDER — CHLORHEXIDINE GLUCONATE 213 G/1000ML
1 SOLUTION TOPICAL ONCE
Refills: 0 | Status: DISCONTINUED | OUTPATIENT
Start: 2023-09-29 | End: 2023-10-13

## 2023-09-28 NOTE — H&P PST ADULT - NSICDXFAMILYHX_GEN_ALL_CORE_FT
After Visit Summary   10/18/2017    Leah Casarez    MRN: [id number]           Patient Information     Date Of Birth          1988        Visit Information        Provider Department      10/18/2017 3:45 PM Payal Messina PA-C [facility]        Today's Diagnoses     Encounter for supervision of other normal pregnancy, unspecified trimester    -  1    Chest wall contusion, unspecified laterality, initial encounter           Follow-ups after your visit        Your next 10 appointments already scheduled     Oct 23, 2017  2:30 PM CDT   (Arrive by 2:15 PM)   [facility] Chiropractor with Yoana Keller DC   [facility]    [address] [place] [id number] [phone]            Nov 01, 2017  3:30 PM CDT   [facility]ABL[facility] with Payal Messina PA-C   [facility]    [address] [place] [id number] [phone]            Nov 15, 2017  3:45 PM CST   ESTABLISHED PRENATAL with Payal Messina PA-C   [facility]    [address] [place] [id number] [phone]              Who to contact     If you have questions or need follow up information about today's clinic visit or your schedule please contact [facility] directly at [phone].  Normal or non-critical lab and imaging results will be communicated to you by MyChart, letter or phone within 4 business days after the clinic has received the results. If you do not hear from us within 7 days, please contact the clinic through MyChart or phone. If you have a critical or abnormal lab result, we will notify you by phone as soon as possible.  Submit refill requests through [employer] or call your pharmacy and they will forward the refill request to us. Please allow 3 business days for your refill to be completed.          Additional  Information About Your Visit        [employer] Information     [employer] gives you secure access to your electronic health record. If you see a primary care provider, you can also send messages to your care team and make appointments. If you have questions, please call your primary care clinic.  If you do not have a primary care provider, please call [phone] and they will assist you.        Care EveryWhere ID     This is your Care EveryWhere ID. This could be used by other organizations to access your [facility] medical records  [id number]        Your Vitals Were     Pulse Temperature Respirations Last Period Pulse Oximetry BMI (Body Mass Index)    88 98  F (36.7  C) (Tympanic) 18 03/18/2017 (Exact Date) 99% 34.7 kg/m2       Blood Pressure from Last 3 Encounters:   10/18/17 118/78   10/02/17 116/68   08/31/17 116/70    Weight from Last 3 Encounters:   10/18/17 235 lb (106.6 kg)   10/02/17 235 lb (106.6 kg)   08/31/17 229 lb (103.9 kg)              Today, you had the following     No orders found for display         Today's Medication Changes          These changes are accurate as of: 10/18/17  5:30 PM.  If you have any questions, ask your nurse or doctor.               Start taking these medicines.        Dose/Directions    acetaminophen-codeine 300-30 MG per tablet   Commonly known as:  TYLENOL #3   Used for:  Chest wall contusion, unspecified laterality, initial encounter   Started by:  Payal Messina PA-C        Dose:  1-2 tablet   Take 1-2 tablets by mouth every 4 hours as needed for pain   Quantity:  50 tablet   Refills:  0            Where to get your medicines      Some of these will need a paper prescription and others can be bought over the counter.  Ask your nurse if you have questions.     Bring a paper prescription for each of these medications     acetaminophen-codeine 300-30 MG per tablet                Primary Care Provider Office Phone # Fax #    Payal Messina PA-C [phone]        [address] [place] [address]        Equal Access to Services     TRAVIS HAMILTON : Hadii aad ku hadakbarwyatt Rose Marieanders, wadioneda luvladimirberthaha, qachavata cindygelashae stevenson, maddy patelartalvarez stevens. So [facility] [phone].    ATENCIÓN: Si habla español, tiene a issa disposición servicios gratuitos de asistencia lingüística. [facility] [phone].    We comply with applicable federal civil rights laws and [place] laws. We do not discriminate on the basis of race, color, national origin, age, disability, sex, sexual orientation, or gender identity.            Thank you!     Thank you for choosing [facility]  for your care. Our goal is always to provide you with excellent care. Hearing back from our patients is one way we can continue to improve our services. Please take a few minutes to complete the written survey that you may receive in the mail after your visit with us. Thank you!             Your Updated Medication List - Protect others around you: Learn how to safely use, store and throw away your medicines at www.disposemymeds.org.          This list is accurate as of: 10/18/17  5:30 PM.  Always use your most recent med list.                   Brand Name Dispense Instructions for use Diagnosis    acetaminophen-codeine 300-30 MG per tablet    TYLENOL #3    50 tablet    Take 1-2 tablets by mouth every 4 hours as needed for pain    Chest wall contusion, unspecified laterality, initial encounter       fluticasone 50 MCG/ACT spray    FLONASE    1 Bottle    Spray 2 sprays into both nostrils daily    Seasonal allergic rhinitis, unspecified chronicity, unspecified trigger       montelukast 10 MG tablet    SINGULAIR    90 tablet    Take 1 tablet (10 mg) by mouth At Bedtime    Seasonal allergic rhinitis, unspecified chronicity, unspecified trigger       PRENATAL VITAMIN PO              FAMILY HISTORY:  Father  Still living? Unknown  FH: CAD (coronary artery disease), Age at diagnosis: Age Unknown    Mother  Still living? Unknown  FH: CAD (coronary artery disease), Age at diagnosis: Age Unknown    Sibling  Still living? Yes, Estimated age: Age Unknown  FH: CAD (coronary artery disease), Age at diagnosis: Age Unknown

## 2023-09-28 NOTE — H&P PST ADULT - ASSESSMENT
Impression:  52F PMH DM2 with complaints of 1 episode of chest pain and no further episodes. She was seen by her PCP and CT chest reveals mildly enlarged heart size; stable small right pericardial cyst measuring about 18mm; large left thyroid mass/nodule with substernal extension causing tracheal deviation to right. Echocardiogram performed 6/24/23 revealing LVEF 55-60%; normal RV size and function. NST performed 9/26/23 exercised 10.4 METs; Stress electrocardiogram: 0.5 mm horizontal ST depression V4, V5, V6. Normal myocardial perfusion scan, with no evidence of infarction or inducible ischemia. Transient ischemic dilation of the left ventricle was noted with a ratio of 1.07. Patient presents to [facility] for elective LHC to further assess coronary arteries.    Risk Assessments:  ASA:  Mallampati:  GFR:   Cr:  BRA:    Plan:  -plan for LHC  -patient seen and examined  -confirmed appropriate NPO duration  -ECG and Labs reviewed  -Aspirin 81mg po pre-cath  -procedure discussed with patient; risks and benefits explained, questions answered  -consent obtained by attending IC  -0.9% NS 250cc bolus ordered to prevent CHI       Impression:  52F PMH DM2 with complaints of 1 episode of chest pain and no further episodes. She was seen by her PCP and CT chest reveals mildly enlarged heart size; stable small right pericardial cyst measuring about 18mm; large left thyroid mass/nodule with substernal extension causing tracheal deviation to right. Echocardiogram performed 6/24/23 revealing LVEF 55-60%; normal RV size and function. NST performed 9/26/23 exercised 10.4 METs; Stress electrocardiogram: 0.5 mm horizontal ST depression V4, V5, V6. Normal myocardial perfusion scan, with no evidence of infarction or inducible ischemia. Transient ischemic dilation of the left ventricle was noted with a ratio of 1.07. Patient presents to [facility] for elective LHC to further assess coronary arteries.    Risk Assessments:  ASA: 3  Mallampati: 2  GFR: 105  Cr: 0.66  BRA: 1.0%    Plan:  -plan for LHC  -patient seen and examined  -confirmed appropriate NPO duration  -ECG and Labs reviewed  -Aspirin 81mg po pre-cath  -procedure discussed with patient; risks and benefits explained, questions answered  -consent obtained by attending IC  -0.9% NS 250cc bolus ordered to prevent CHI

## 2023-09-28 NOTE — H&P PST ADULT - HISTORY OF PRESENT ILLNESS
52F PMH DM2 with complaints of 1 episode of chest pain and no further episodes. She was seen by her PCP and CT chest reveals mildly enlarged heart size; stable small right pericardial cyst measuring about 18mm; large left thyroid mass/nodule with substernal extension causing tracheal deviation to right. Echocardiogram performed 6/24/23 revealing LVEF 55-60%; normal RV size and function. NST performed 9/26/23 exercised 10.4 METs; Stress electrocardiogram: 0.5 mm horizontal ST depression V4, V5, V6. Normal myocardial perfusion scan, with no evidence of infarction or inducible ischemia. Transient ischemic dilation of the left ventricle was noted with a ratio of 1.07. Patient presents to [facility] for elective LHC to further assess coronary arteries.    Symptoms:        Angina (Class):        Ischemic Symptoms:     Heart Failure:        Systolic/Diastolic/Combined: n/a       NYHA Class (within 2 weeks): n/a    Assessment of LVEF (Must be within 6 months):       EF: 55-60%       Assessed by: TTE        Date: 6/24/23    Prior Cardiac Interventions:       PCI's (Date, Stents, Vessels): n/a       CABG (Date, Grafts): n/a    Noninvasive Testing:   Stress Test: Date: 9/27/23       Protocol: Nuclear Exercise Stress Test  1.The patient underwent stress testing using the standard Dariusz protocol. The patient exercised for 9 min 1 sec. The peak heart rate was 171 bpm; 103 % of predicted maximal heart rate for this patient. The patient achieved 10.4 METS which is consistent with good exercise capacity.  2. Baseline electrocardiogram: normal sinus rhythm at a rate of 80 bpm with nonspecific T wave   abnormality leads III, V3, V4, V5, V6.  3. Patient achieved 10.4 METS, which is consistent with good exercise capacity.  4. Normal heart rate response.  5. Hypertensive blood pressure response.  6. Stress electrocardiogram: 0.5 mm horizontal ST depression V4, V5, V6 2 min into recovery resolving completely to baseline 60 min later (post images).  7. Normal myocardial perfusion scan, with no evidence of infarction or inducible ischemia.  8. The left ventricle is normal in function. The left ventricular EF% during stress is 76 %      Echo (Date, Findings): TTE PERFORMED 6/24/24  1. The left ventricular systolic function is normal with an ejection fraction of 58 % by Burdick's method of disks with an ejection fraction visually estimated at 55 to 60 %.  2. There is normal left ventricular diastolic function.  3. Normal right ventricular cavity size and normal right ventricular systolic function.  4. Trace tricuspid regurgitation.  5. Trace pulmonic regurgitation.  6. Trace pericardial effusion with no evidence of hemodynamic compromise.  7. Pericardial fat pad is seen anterior to the right ventricle.  8. No prior echocardiogram is available for comparison      Antianginal Therapies:        Beta Blockers:  n/a       Calcium Channel Blockers: n/a        Long Acting Nitrates: n/a       Ranexa: n/a      Associated Risk Factors:        Cerebrovascular Disease: N/A       Chronic Lung Disease: N/A       Peripheral Arterial Disease: N/A       Chronic Kidney Disease (if yes, what is GFR): N/A       Uncontrolled Diabetes (if yes, what is HgbA1C or FBS): N/A       Poorly Controlled Hypertension (if yes, what is SBP): N/A       Morbid Obesity (if yes, what is BMI): N/A       History of Recent Ventricular Arrhythmia: N/A       Inability to Ambulate Safely: N/A       Need for Therapeutic Anticoagulation: N/A       Antiplatelet or Contrast Allergy: N/A     52F PMH DM2 with complaints of 1 episode of chest pain and no further episodes. She was seen by her PCP and CT chest reveals mildly enlarged heart size; stable small right pericardial cyst measuring about 18mm; large left thyroid mass/nodule with substernal extension causing tracheal deviation to right. Echocardiogram performed 6/24/23 revealing LVEF 55-60%; normal RV size and function. NST performed 9/26/23 exercised 10.4 METs; Stress electrocardiogram: 0.5 mm horizontal ST depression V4, V5, V6. Normal myocardial perfusion scan, with no evidence of infarction or inducible ischemia. Transient ischemic dilation of the left ventricle was noted with a ratio of 1.07. Patient presents to [facility] for elective LHC to further assess coronary arteries.    Symptoms:        Angina (Class): IV       Ischemic Symptoms: Chest Pain    Heart Failure:        Systolic/Diastolic/Combined: n/a       NYHA Class (within 2 weeks): n/a    Assessment of LVEF (Must be within 6 months):       EF: 55-60%       Assessed by: TTE        Date: 6/24/23    Prior Cardiac Interventions:       PCI's (Date, Stents, Vessels): n/a       CABG (Date, Grafts): n/a    Noninvasive Testing:   Stress Test: Date: 9/27/23       Protocol: Nuclear Exercise Stress Test  1.The patient underwent stress testing using the standard Dariusz protocol. The patient exercised for 9 min 1 sec. The peak heart rate was 171 bpm; 103 % of predicted maximal heart rate for this patient. The patient achieved 10.4 METS which is consistent with good exercise capacity.  2. Baseline electrocardiogram: normal sinus rhythm at a rate of 80 bpm with nonspecific T wave   abnormality leads III, V3, V4, V5, V6.  3. Patient achieved 10.4 METS, which is consistent with good exercise capacity.  4. Normal heart rate response.  5. Hypertensive blood pressure response.  6. Stress electrocardiogram: 0.5 mm horizontal ST depression V4, V5, V6 2 min into recovery resolving completely to baseline 60 min later (post images).  7. Normal myocardial perfusion scan, with no evidence of infarction or inducible ischemia.  8. The left ventricle is normal in function. The left ventricular EF% during stress is 76 %      Echo (Date, Findings): TTE PERFORMED 6/24/24  1. The left ventricular systolic function is normal with an ejection fraction of 58 % by Burdick's method of disks with an ejection fraction visually estimated at 55 to 60 %.  2. There is normal left ventricular diastolic function.  3. Normal right ventricular cavity size and normal right ventricular systolic function.  4. Trace tricuspid regurgitation.  5. Trace pulmonic regurgitation.  6. Trace pericardial effusion with no evidence of hemodynamic compromise.  7. Pericardial fat pad is seen anterior to the right ventricle.  8. No prior echocardiogram is available for comparison      Antianginal Therapies:        Beta Blockers:  n/a       Calcium Channel Blockers: n/a        Long Acting Nitrates: n/a       Ranexa: n/a      Associated Risk Factors:        Cerebrovascular Disease: N/A       Chronic Lung Disease: N/A       Peripheral Arterial Disease: N/A       Chronic Kidney Disease (if yes, what is GFR): N/A       Uncontrolled Diabetes (if yes, what is HgbA1C or FBS): N/A       Poorly Controlled Hypertension (if yes, what is SBP): N/A       Morbid Obesity (if yes, what is BMI): N/A       History of Recent Ventricular Arrhythmia: N/A       Inability to Ambulate Safely: N/A       Need for Therapeutic Anticoagulation: N/A       Antiplatelet or Contrast Allergy: N/A    ROS: as stated above, otherwise negative    PHYSICAL EXAM:  Constitutional: A & O x 3, NAD  HEENT:  Normal oral mucosa, PERRL, EOMI	  Cardiovascular: S1 S2, No murmurs, No JVD  Respiratory: Lungs clear to auscultation	  Gastrointestinal:  Soft, Non-tender, + BS	  Skin: No rashes or cyanosis  Neurologic: No deficit appreciated  Extremities: Normal range of motion, no edema  Vascular: right + PT, no DP, left + palapble DP/PT     52F PMH DM2 with complaints of 1 episode of chest pain while sleeping which woke her up and lasted several hours intermittently and no further episodes. She was seen by her PCP and CT chest reveals mildly enlarged heart size; stable small right pericardial cyst measuring about 18mm; large left thyroid mass/nodule with substernal extension causing tracheal deviation to right. Echocardiogram performed 6/24/23 revealing LVEF 55-60%; normal RV size and function. NST performed 9/26/23 exercised 10.4 METs; Stress electrocardiogram: 0.5 mm horizontal ST depression V4, V5, V6. Normal myocardial perfusion scan, with no evidence of infarction or inducible ischemia. Transient ischemic dilation of the left ventricle was noted with a ratio of 1.07. Patient presents to [facility] for elective LHC to further assess coronary arteries. Patient also states right leg pain when walking.    Symptoms:        Angina (Class): IV       Ischemic Symptoms: Chest Pain    Heart Failure:        Systolic/Diastolic/Combined: n/a       NYHA Class (within 2 weeks): n/a    Assessment of LVEF (Must be within 6 months):       EF: 55-60%       Assessed by: TTE        Date: 6/24/23    Prior Cardiac Interventions:       PCI's (Date, Stents, Vessels): n/a       CABG (Date, Grafts): n/a    Noninvasive Testing:   Stress Test: Date: 9/27/23       Protocol: Nuclear Exercise Stress Test  1.The patient underwent stress testing using the standard Dariusz protocol. The patient exercised for 9 min 1 sec. The peak heart rate was 171 bpm; 103 % of predicted maximal heart rate for this patient. The patient achieved 10.4 METS which is consistent with good exercise capacity.  2. Baseline electrocardiogram: normal sinus rhythm at a rate of 80 bpm with nonspecific T wave   abnormality leads III, V3, V4, V5, V6.  3. Patient achieved 10.4 METS, which is consistent with good exercise capacity.  4. Normal heart rate response.  5. Hypertensive blood pressure response.  6. Stress electrocardiogram: 0.5 mm horizontal ST depression V4, V5, V6 2 min into recovery resolving completely to baseline 60 min later (post images).  7. Normal myocardial perfusion scan, with no evidence of infarction or inducible ischemia.  8. The left ventricle is normal in function. The left ventricular EF% during stress is 76 %      Echo (Date, Findings): TTE PERFORMED 6/24/24  1. The left ventricular systolic function is normal with an ejection fraction of 58 % by Burdick's method of disks with an ejection fraction visually estimated at 55 to 60 %.  2. There is normal left ventricular diastolic function.  3. Normal right ventricular cavity size and normal right ventricular systolic function.  4. Trace tricuspid regurgitation.  5. Trace pulmonic regurgitation.  6. Trace pericardial effusion with no evidence of hemodynamic compromise.  7. Pericardial fat pad is seen anterior to the right ventricle.  8. No prior echocardiogram is available for comparison      Antianginal Therapies:        Beta Blockers:  n/a       Calcium Channel Blockers: n/a        Long Acting Nitrates: n/a       Ranexa: n/a      Associated Risk Factors:        Cerebrovascular Disease: N/A       Chronic Lung Disease: N/A       Peripheral Arterial Disease: N/A       Chronic Kidney Disease (if yes, what is GFR): N/A       Uncontrolled Diabetes (if yes, what is HgbA1C or FBS): N/A       Poorly Controlled Hypertension (if yes, what is SBP): N/A       Morbid Obesity (if yes, what is BMI): N/A       History of Recent Ventricular Arrhythmia: N/A       Inability to Ambulate Safely: N/A       Need for Therapeutic Anticoagulation: N/A       Antiplatelet or Contrast Allergy: N/A    ROS: as stated above, otherwise negative    PHYSICAL EXAM:  Constitutional: A & O x 3, NAD  HEENT:  Normal oral mucosa, PERRL, EOMI	  Cardiovascular: S1 S2, No murmurs, No JVD  Respiratory: Lungs clear to auscultation	  Gastrointestinal:  Soft, Non-tender, + BS	  Skin: No rashes or cyanosis  Neurologic: No deficit appreciated  Extremities: Normal range of motion, no edema  Vascular: right + PT, no DP, left + palpable DP/PT

## 2023-09-29 ENCOUNTER — TRANSCRIPTION ENCOUNTER (OUTPATIENT)
Age: 53
End: 2023-09-29

## 2023-09-29 ENCOUNTER — OUTPATIENT (OUTPATIENT)
Dept: OUTPATIENT SERVICES | Facility: HOSPITAL | Age: 53
LOS: 1 days | Discharge: HOME | End: 2023-09-29
Payer: MEDICAID

## 2023-09-29 VITALS
WEIGHT: 149.91 LBS | DIASTOLIC BLOOD PRESSURE: 78 MMHG | TEMPERATURE: 98 F | HEIGHT: 60 IN | SYSTOLIC BLOOD PRESSURE: 121 MMHG | OXYGEN SATURATION: 100 % | RESPIRATION RATE: 16 BRPM | HEART RATE: 77 BPM

## 2023-09-29 VITALS
SYSTOLIC BLOOD PRESSURE: 123 MMHG | DIASTOLIC BLOOD PRESSURE: 62 MMHG | RESPIRATION RATE: 16 BRPM | OXYGEN SATURATION: 99 % | HEART RATE: 80 BPM

## 2023-09-29 DIAGNOSIS — R07.9 CHEST PAIN, UNSPECIFIED: ICD-10-CM

## 2023-09-29 LAB
ANION GAP SERPL CALC-SCNC: 13 MMOL/L — SIGNIFICANT CHANGE UP (ref 5–17)
BASOPHILS # BLD AUTO: 0.04 K/UL — SIGNIFICANT CHANGE UP (ref 0–0.2)
BASOPHILS NFR BLD AUTO: 0.5 % — SIGNIFICANT CHANGE UP (ref 0–2)
BUN SERPL-MCNC: 11.5 MG/DL — SIGNIFICANT CHANGE UP (ref 8–20)
CALCIUM SERPL-MCNC: 9.2 MG/DL — SIGNIFICANT CHANGE UP (ref 8.4–10.5)
CHLORIDE SERPL-SCNC: 101 MMOL/L — SIGNIFICANT CHANGE UP (ref 96–108)
CO2 SERPL-SCNC: 23 MMOL/L — SIGNIFICANT CHANGE UP (ref 22–29)
CREAT SERPL-MCNC: 0.66 MG/DL — SIGNIFICANT CHANGE UP (ref 0.5–1.3)
EGFR: 105 ML/MIN/1.73M2 — SIGNIFICANT CHANGE UP
EOSINOPHIL # BLD AUTO: 0.27 K/UL — SIGNIFICANT CHANGE UP (ref 0–0.5)
EOSINOPHIL NFR BLD AUTO: 3.1 % — SIGNIFICANT CHANGE UP (ref 0–6)
GLUCOSE SERPL-MCNC: 157 MG/DL — HIGH (ref 70–99)
HCG SERPL QL: NEGATIVE — SIGNIFICANT CHANGE UP
HCT VFR BLD CALC: 38.8 % — SIGNIFICANT CHANGE UP (ref 34.5–45)
HGB BLD-MCNC: 13 G/DL — SIGNIFICANT CHANGE UP (ref 11.5–15.5)
IMM GRANULOCYTES NFR BLD AUTO: 0.3 % — SIGNIFICANT CHANGE UP (ref 0–0.9)
LYMPHOCYTES # BLD AUTO: 2.63 K/UL — SIGNIFICANT CHANGE UP (ref 1–3.3)
LYMPHOCYTES # BLD AUTO: 30.3 % — SIGNIFICANT CHANGE UP (ref 13–44)
MAGNESIUM SERPL-MCNC: 1.9 MG/DL — SIGNIFICANT CHANGE UP (ref 1.6–2.6)
MCHC RBC-ENTMCNC: 29.2 PG — SIGNIFICANT CHANGE UP (ref 27–34)
MCHC RBC-ENTMCNC: 33.5 GM/DL — SIGNIFICANT CHANGE UP (ref 32–36)
MCV RBC AUTO: 87.2 FL — SIGNIFICANT CHANGE UP (ref 80–100)
MONOCYTES # BLD AUTO: 0.48 K/UL — SIGNIFICANT CHANGE UP (ref 0–0.9)
MONOCYTES NFR BLD AUTO: 5.5 % — SIGNIFICANT CHANGE UP (ref 2–14)
NEUTROPHILS # BLD AUTO: 5.22 K/UL — SIGNIFICANT CHANGE UP (ref 1.8–7.4)
NEUTROPHILS NFR BLD AUTO: 60.3 % — SIGNIFICANT CHANGE UP (ref 43–77)
PLATELET # BLD AUTO: 337 K/UL — SIGNIFICANT CHANGE UP (ref 150–400)
POTASSIUM SERPL-MCNC: 4 MMOL/L — SIGNIFICANT CHANGE UP (ref 3.5–5.3)
POTASSIUM SERPL-SCNC: 4 MMOL/L — SIGNIFICANT CHANGE UP (ref 3.5–5.3)
RBC # BLD: 4.45 M/UL — SIGNIFICANT CHANGE UP (ref 3.8–5.2)
RBC # FLD: 13.1 % — SIGNIFICANT CHANGE UP (ref 10.3–14.5)
SODIUM SERPL-SCNC: 137 MMOL/L — SIGNIFICANT CHANGE UP (ref 135–145)
WBC # BLD: 8.67 K/UL — SIGNIFICANT CHANGE UP (ref 3.8–10.5)
WBC # FLD AUTO: 8.67 K/UL — SIGNIFICANT CHANGE UP (ref 3.8–10.5)

## 2023-09-29 PROCEDURE — 83735 ASSAY OF MAGNESIUM: CPT

## 2023-09-29 PROCEDURE — 93454 CORONARY ARTERY ANGIO S&I: CPT | Mod: 26,59

## 2023-09-29 PROCEDURE — C1887: CPT

## 2023-09-29 PROCEDURE — 93005 ELECTROCARDIOGRAM TRACING: CPT

## 2023-09-29 PROCEDURE — 99152 MOD SED SAME PHYS/QHP 5/>YRS: CPT

## 2023-09-29 PROCEDURE — C1769: CPT

## 2023-09-29 PROCEDURE — 80048 BASIC METABOLIC PNL TOTAL CA: CPT

## 2023-09-29 PROCEDURE — 84703 CHORIONIC GONADOTROPIN ASSAY: CPT

## 2023-09-29 PROCEDURE — 36415 COLL VENOUS BLD VENIPUNCTURE: CPT

## 2023-09-29 PROCEDURE — 93454 CORONARY ARTERY ANGIO S&I: CPT

## 2023-09-29 PROCEDURE — 85025 COMPLETE CBC W/AUTO DIFF WBC: CPT

## 2023-09-29 PROCEDURE — 93010 ELECTROCARDIOGRAM REPORT: CPT

## 2023-09-29 RX ORDER — MONTELUKAST 4 MG/1
1 TABLET, CHEWABLE ORAL
Refills: 0 | DISCHARGE

## 2023-09-29 RX ORDER — ASPIRIN/CALCIUM CARB/MAGNESIUM 324 MG
81 TABLET ORAL ONCE
Refills: 0 | Status: COMPLETED | OUTPATIENT
Start: 2023-09-29 | End: 2023-09-29

## 2023-09-29 RX ORDER — ALOGLIPTIN 12.5 MG/1
1 TABLET, FILM COATED ORAL
Refills: 0 | DISCHARGE

## 2023-09-29 RX ORDER — SODIUM CHLORIDE 9 MG/ML
250 INJECTION INTRAMUSCULAR; INTRAVENOUS; SUBCUTANEOUS ONCE
Refills: 0 | Status: COMPLETED | OUTPATIENT
Start: 2023-09-29 | End: 2023-09-29

## 2023-09-29 RX ORDER — ALBUTEROL 90 UG/1
2 AEROSOL, METERED ORAL
Refills: 0 | DISCHARGE

## 2023-09-29 RX ADMIN — SODIUM CHLORIDE 250 MILLILITER(S): 9 INJECTION INTRAMUSCULAR; INTRAVENOUS; SUBCUTANEOUS at 08:15

## 2023-09-29 RX ADMIN — Medication 81 MILLIGRAM(S): at 08:15

## 2023-09-29 RX ADMIN — SODIUM CHLORIDE 250 MILLILITER(S): 9 INJECTION INTRAMUSCULAR; INTRAVENOUS; SUBCUTANEOUS at 09:30

## 2023-09-29 NOTE — DISCHARGE NOTE PROVIDER - CARE PROVIDER_API CALL
Earl Ghosh  Cardiology  [address] [place] [address]  Phone: [phone]  Fax: [phone]  Follow Up Time:     Michelle Montoya  Internal Medicine  [address] [place] [address]  Phone: [phone]  Fax: [phone]  Follow Up Time:

## 2023-09-29 NOTE — DISCHARGE NOTE NURSING/CASE MANAGEMENT/SOCIAL WORK - PATIENT PORTAL LINK FT
You can access the FollowMyHealth Patient Portal offered by [facility] by registering at the following website: [email or web address]. By joining [facility] FollowMyHealth portal, you will also be able to view your health information using other applications (apps) compatible with our system.

## 2023-09-29 NOTE — DISCHARGE NOTE NURSING/CASE MANAGEMENT/SOCIAL WORK - NSDCPEFALRISK_GEN_ALL_CORE
For information on Fall & Injury Prevention, visit: [email or web address] OR  [email or web address] OR  https://www.cdc.gov/steadi/patient.html Dermal Autograft Text: The defect edges were debeveled with a #15 scalpel blade.  Given the location of the defect, shape of the defect and the proximity to free margins a dermal autograft was deemed most appropriate.  Using a sterile surgical marker, the primary defect shape was transferred to the donor site. The area thus outlined was incised deep to adipose tissue with a #15 scalpel blade.  The harvested graft was then trimmed of adipose and epidermal tissue until only dermis was left.  The skin graft was then placed in the primary defect and oriented appropriately.

## 2023-09-29 NOTE — DISCHARGE NOTE PROVIDER - NSDCCPTREATMENT_GEN_ALL_CORE_FT
PRINCIPAL PROCEDURE  Procedure: Left heart cardiac cath  Findings and Treatment: Go to the ED with any acute onset of chest pain, palpitations, shortness of breath or dizziness.   Managing risk factors will help prevent future blockages, risk factors may include: high blood pressure, high cholesterol, obesity, sedentary life style and smoking.    Your diet should be low in fat, cholesterol, salt and carbohydrates, increase fruits (caution if diabetic), vegetables and whole grains/fiber rich foods.   Take all your cardiac  medications as prescribed.    Restricted use with no heavy lifting of affected arm for 48 hours.  No submerging the arm in water for 48 hours.  You may start showering today.  Call your doctor for any bleeding, swelling, loss of sensation in the hand or fingers, or fingers turning blue.  If heavy bleeding or large lumps form, hold pressure at the spot and come to the Emergency Room.  access  Exercise is a very important factor in heart health. Once your post procedure restrictions have passed, you should engage in heart healthy, aerobic exercise. Be sure to have clearance from your cardiologist. Cardiac rehab programs could be extremely beneficial and your cardiologist could help set this up.   Follow up with your cardiologist within 1-2 weeks after your procedure.   Call your cardiologist or our unit ([phone]) with any questions or concerns that may arise.

## 2023-09-29 NOTE — DISCHARGE NOTE PROVIDER - NSDCFUSCHEDAPPT_GEN_ALL_CORE_FT
Prem Rodriguez  [facility] Physician [facility]  CARDIOLOGY [address] [facility]  Scheduled Appointment: 10/16/2023

## 2023-09-29 NOTE — ASU PATIENT PROFILE, ADULT - FALL HARM RISK - UNIVERSAL INTERVENTIONS
Bed in lowest position, wheels locked, appropriate side rails in place/Call bell, personal items and telephone in reach/Instruct patient to call for assistance before getting out of bed or chair/Non-slip footwear when patient is out of bed/[place] to call system/Physically safe environment - no spills, clutter or unnecessary equipment/Purposeful Proactive Rounding/Room/bathroom lighting operational, light cord in reach

## 2023-09-29 NOTE — DISCHARGE NOTE PROVIDER - NSDCCPCAREPLAN_GEN_ALL_CORE_FT
PRINCIPAL DISCHARGE DIAGNOSIS  Diagnosis: Chest pain with normal coronary angiography  Assessment and Plan of Treatment:

## 2023-09-29 NOTE — DISCHARGE NOTE PROVIDER - CARE PROVIDERS DIRECT ADDRESSES
[email or web address]

## 2023-09-29 NOTE — DISCHARGE NOTE PROVIDER - HOSPITAL COURSE
52F PMH DM2 with complaints of 1 episode of chest pain and no further episodes. She was seen by her PCP and CT chest reveals mildly enlarged heart size; stable small right pericardial cyst measuring about 18mm; large left thyroid mass/nodule with substernal extension causing tracheal deviation to right. Echocardiogram performed 6/24/23 revealing LVEF 55-60%; normal RV size and function. NST performed 9/26/23 exercised 10.4 METs; Stress electrocardiogram: 0.5 mm horizontal ST depression V4, V5, V6. Normal myocardial perfusion scan, with no evidence of infarction or inducible ischemia. Transient ischemic dilation of the left ventricle was noted with a ratio of 1.07. Patient presents to [facility] for elective LHC to further assess coronary arteries.    s/p LHC via RRA with Dr. Carpenter tolerated procedure well. Pt arrived to recovery in NAD and HDS, RRA access site stable, no bleed/hematoma, distal pulse +, band in place  Intraprocedurally: Heparin 3,000 units, Versed 1 mg and Fentanyl 25 ucg  Findings: Normal Coronaries

## 2023-09-29 NOTE — DISCHARGE NOTE PROVIDER - NSDCMRMEDTOKEN_GEN_ALL_CORE_FT
Albuterol (Eqv-ProAir HFA) 90 mcg/inh inhalation aerosol: 2 puff(s) inhaled prn  alogliptin 12.5 mg oral tablet: 1 tab(s) orally once a day  montelukast 10 mg oral tablet: 1 tab(s) orally once a day

## 2023-09-29 NOTE — CHART NOTE - NSCHARTNOTEFT_GEN_A_CORE
Now s/p LHC via RRA with Dr. Carpenter tolerated procedure well. Pt arrived to recovery in NAD and HDS, RRA access site stable, no bleed/hematoma, distal pulse +, band in place  Intraprocedurally: Heparin 3,000 units, Versed 1 mg and Fentanyl 25 ucg  Findings: Normal Coronaries      Plan:  -Formal cath report pending  -Post procedure management/monitoring per protocol  -Access site precautions  -Radial compression band removal at 1030  -Bedrest with HOB 45 while band in place post procedure  -Labs and EKG in am  -NS 0.9% 250ml/hr x 1 bolus: post procedure [facility] ppx   -Repeat ECG if any clinical indication or change on tele  -Continue current medical therapy  -Educated regarding post procedure management and care  -Discussed the importance of RF modification  -Cardiac rehab info provided/referral and communication to cardiac rehab completed  -F/U outpt in 1-2 weeks with Cardiologist Dr. Ghosh/Dr. Montoya  -Outpatient [facility] for claudication  -DISPO: Plan for D/C if remains HDS and radial site stable and without complications

## 2023-10-16 ENCOUNTER — APPOINTMENT (OUTPATIENT)
Dept: CARDIOLOGY | Facility: CLINIC | Age: 53
End: 2023-10-16
Payer: MEDICAID

## 2023-10-16 VITALS
BODY MASS INDEX: 28.47 KG/M2 | SYSTOLIC BLOOD PRESSURE: 122 MMHG | HEART RATE: 72 BPM | WEIGHT: 145 LBS | OXYGEN SATURATION: 97 % | DIASTOLIC BLOOD PRESSURE: 76 MMHG | HEIGHT: 60 IN

## 2023-10-16 DIAGNOSIS — M79.601 PAIN IN RIGHT ARM: ICD-10-CM

## 2023-10-16 PROBLEM — E11.9 TYPE 2 DIABETES MELLITUS WITHOUT COMPLICATIONS: Chronic | Status: ACTIVE | Noted: 2023-09-28

## 2023-10-16 PROCEDURE — 99214 OFFICE O/P EST MOD 30 MIN: CPT

## 2024-01-18 NOTE — ASU PATIENT PROFILE, ADULT - HISTORY OF COVID-19 VACCINATION
PHYSICAL THERAPY EVALUATION  Type of Visit: Evaluation    See electronic medical record for Abuse and Falls Screening details.    Patient had been dx with Stage 1 breast cancer, she had a lumpectomy and a sentinel lymph node biopsy, then 5 days of radiation in May 2023.   She noted the swelling started, she noted some heaviness when she rolled over, and then last week she saw her oncologist that also feels there could be some     Subjective       Presenting condition or subjective complaint: Fluid in breast and under arm  Date of onset: 12/06/23    Relevant medical history: Arthritis; Cancer; Dizziness; Osteoporosis; Radiation treatment   Dates & types of surgery: Lumpectomy of right breast and sentinel LN removed    Prior diagnostic imaging/testing results:       Prior therapy history for the same diagnosis, illness or injury: No PT for her back pain and shoulder in the past    Prior Level of Function  Transfers: Independent  Ambulation: Independent  ADL: Independent    Living Environment  Social support: With a significant other or spouse   Type of home: House   Stairs to enter the home: Yes 2 Is there a railing: No   Ramp: No   Stairs inside the home: Yes 13 Is there a railing: Yes   Help at home: None  Equipment owned: Walker     Employment: No    Hobbies/Interests: reading, writing, walking, grandchildren    Patient goals for therapy: NA    Pain assessment: See objective evaluation for additional pain details  Does have some back pain that has been ongoing, hx of osteoporosis      Objective       EDEMA EVALUATION  Additional history:  Body part affected by edema:    If cancer related, treatment:    If not cancer related, problems with veins or cause of swelling:    Distance able to walk:    Time able to stand:    Sensation problems in hands/feet:      Edema etiology: Cancer with lymph node dissection, Radiation    FUNCTIONAL SCALES  Lower Extremity Functional Scale (score out of 80). A lower score indicates  greater impairment:    Shoulder Pain and Disability Index (score out of 100).  A higher score indicates greater impairment:      Cognitive Status Examination  Orientation: Oriented to person, place and time   Level of Consciousness: Alert  Follows Commands and Answers Questions: 100% of the time  Personal Safety and Judgement: Intact  Memory: Intact    EDEMA  Skin Condition: WNL, Intact  Scar: Yes  Location: anterior right breast superior to the nipple  Mobility: WNL, mild tenderness     Ulceration: No    GIRTH MEASUREMENTS: Refer to separate girth measurement flowsheet.     VOLUME UE  Right UE (mL)    Left UE (mL)    UE Volume Comparison    % Difference      RANGE OF MOTION:  WNL bilateral AROM of shoulder flexion, abduction, functional reaching behind head and back without pain   STRENGTH: UE Strength WFL    POSTURE: WFL  PALPATION: mild increased edema to the right breast with noted more fullness to the medial side of the breast. Mild tenderness to the scar but well healed and good mobility.     ACTIVITIES OF DAILY LIVING: WNL  BED MOBILITY: Independent  TRANSFERS: Independent    Assessment & Plan   CLINICAL IMPRESSIONS  Medical Diagnosis: invasive ductual carcinoma of breast, stage 1, right    Treatment Diagnosis: invasive ductual carcinoma of breast, stage 1, right; Lymphedema   Impression/Assessment: Patient is a 75 year old female with complaints of mild edema or a sensation of heaviness of the right breast following right breast cancer with radiation and sentinel node biopsy.  The following significant findings have been identified: Edema. These impairments interfere with their ability to perform recreational activities as compared to previous level of function.     Clinical Decision Making (Complexity):  Clinical Presentation: Stable/Uncomplicated  Clinical Presentation Rationale: based on medical and personal factors listed in PT evaluation  Clinical Decision Making (Complexity): Low complexity    PLAN OF  CARE  Treatment Interventions:  Interventions: Manual Therapy, Neuromuscular Re-education, Therapeutic Activity, Therapeutic Exercise, Self-Care/Home Management, Gradient Compression Bandaging    Long Term Goals     PT Goal 1  Goal Identifier: HEP  Goal Description: Patient will be independent in a HEP for ongoing symptom management in 12 weeks  Target Date: 04/11/24  PT Goal 2  Goal Identifier: self care/home management  Goal Description: Patient will be independent in self care and home management techniques such as self MLD, compression use and care for, skin care for ongoing symptom management in 12 weeks  Target Date: 04/11/24      Frequency of Treatment: 1 time per week to every 2-3 weeks  Duration of Treatment: for up to 12 weeks    Recommended Referrals to Other Professionals:  none  Education Assessment:   Learner/Method: Patient;Reading;Listening;Demonstration;Pictures/Video;No Barriers to Learning    Risks and benefits of evaluation/treatment have been explained.   Patient/Family/caregiver agrees with Plan of Care.     Evaluation Time:     PT Eval, Low Complexity Minutes (94367): 35   Present: Not applicable     Signing Clinician: Key Saldaña, PT      [facility]                                                                                   OUTPATIENT PHYSICAL THERAPY      PLAN OF TREATMENT FOR OUTPATIENT REHABILITATION   Patient's Last Name, First Name, M.I.  Parent,Cordelia CABALLERO YOB: 1948   Provider's Name   [facility]   Medical Record No.  [id number]     Onset Date: 12/06/23  Start of Care Date: 01/18/24     Medical Diagnosis:  invasive ductual carcinoma of breast, stage 1, right      PT Treatment Diagnosis:  invasive ductual carcinoma of breast, stage 1, right; Lymphedema Plan of Treatment  Frequency/Duration: 1 time per week to every 2-3 weeks/ for up to 12 weeks    Certification date from 01/18/24 to 04/11/24          See note for plan of treatment details and functional goals     Key Saldaña, PT                         I CERTIFY THE NEED FOR THESE SERVICES FURNISHED UNDER        THIS PLAN OF TREATMENT AND WHILE UNDER MY CARE     (Physician attestation of this document indicates review and certification of the therapy plan).              Referring Provider:  Cheyenne Jaimes    Initial Assessment  See Epic Evaluation- Start of Care Date: 01/18/24                 Vaccine status unknown

## 2024-02-15 ENCOUNTER — OFFICE (OUTPATIENT)
Dept: URBAN - METROPOLITAN AREA CLINIC 94 | Facility: CLINIC | Age: 54
Setting detail: OPHTHALMOLOGY
End: 2024-02-15
Payer: MEDICAID

## 2024-02-15 DIAGNOSIS — H40.033: ICD-10-CM

## 2024-02-15 DIAGNOSIS — H04.123: ICD-10-CM

## 2024-02-15 DIAGNOSIS — H43.393: ICD-10-CM

## 2024-02-15 DIAGNOSIS — H25.13: ICD-10-CM

## 2024-02-15 PROCEDURE — 99213 OFFICE O/P EST LOW 20 MIN: CPT | Performed by: OPHTHALMOLOGY

## 2024-02-15 ASSESSMENT — SPHEQUIV_DERIVED
OS_SPHEQUIV: 1.125
OD_SPHEQUIV: 0.75

## 2024-02-15 ASSESSMENT — REFRACTION_AUTOREFRACTION
OD_SPHERE: +1.00
OS_CYLINDER: -0.25
OD_AXIS: 032
OD_CYLINDER: -0.50
OS_AXIS: 096
OS_SPHERE: +1.25

## 2024-02-15 ASSESSMENT — CONFRONTATIONAL VISUAL FIELD TEST (CVF)
OS_FINDINGS: FULL
OD_FINDINGS: FULL

## 2024-07-07 ENCOUNTER — EMERGENCY (EMERGENCY)
Facility: HOSPITAL | Age: 54
LOS: 1 days | Discharge: HOME | End: 2024-07-07
Attending: STUDENT IN AN ORGANIZED HEALTH CARE EDUCATION/TRAINING PROGRAM
Payer: MEDICAID

## 2024-07-07 VITALS
RESPIRATION RATE: 16 BRPM | TEMPERATURE: 98 F | WEIGHT: 141.1 LBS | SYSTOLIC BLOOD PRESSURE: 129 MMHG | HEART RATE: 93 BPM | OXYGEN SATURATION: 97 % | HEIGHT: 60 IN | DIASTOLIC BLOOD PRESSURE: 81 MMHG

## 2024-07-07 LAB
ALBUMIN SERPL ELPH-MCNC: 4.1 G/DL — SIGNIFICANT CHANGE UP (ref 3.3–5.2)
ALP SERPL-CCNC: 117 U/L — SIGNIFICANT CHANGE UP (ref 40–120)
ALT FLD-CCNC: 46 U/L — HIGH
ANION GAP SERPL CALC-SCNC: 11 MMOL/L — SIGNIFICANT CHANGE UP (ref 5–17)
APPEARANCE UR: ABNORMAL
AST SERPL-CCNC: 38 U/L — HIGH
BACTERIA # UR AUTO: ABNORMAL /HPF
BASOPHILS # BLD AUTO: 0.02 K/UL — SIGNIFICANT CHANGE UP (ref 0–0.2)
BASOPHILS NFR BLD AUTO: 0.2 % — SIGNIFICANT CHANGE UP (ref 0–2)
BILIRUB SERPL-MCNC: 0.5 MG/DL — SIGNIFICANT CHANGE UP (ref 0.4–2)
BILIRUB UR-MCNC: NEGATIVE — SIGNIFICANT CHANGE UP
BUN SERPL-MCNC: 12.2 MG/DL — SIGNIFICANT CHANGE UP (ref 8–20)
CALCIUM SERPL-MCNC: 9.2 MG/DL — SIGNIFICANT CHANGE UP (ref 8.4–10.5)
CAST: 1 /LPF — SIGNIFICANT CHANGE UP (ref 0–4)
CHLORIDE SERPL-SCNC: 102 MMOL/L — SIGNIFICANT CHANGE UP (ref 96–108)
CO2 SERPL-SCNC: 27 MMOL/L — SIGNIFICANT CHANGE UP (ref 22–29)
COLOR SPEC: SIGNIFICANT CHANGE UP
CREAT SERPL-MCNC: 0.66 MG/DL — SIGNIFICANT CHANGE UP (ref 0.5–1.3)
DIFF PNL FLD: ABNORMAL
EGFR: 104 ML/MIN/1.73M2 — SIGNIFICANT CHANGE UP
EOSINOPHIL # BLD AUTO: 0.25 K/UL — SIGNIFICANT CHANGE UP (ref 0–0.5)
EOSINOPHIL NFR BLD AUTO: 2.8 % — SIGNIFICANT CHANGE UP (ref 0–6)
GLUCOSE SERPL-MCNC: 109 MG/DL — HIGH (ref 70–99)
GLUCOSE UR QL: NEGATIVE MG/DL — SIGNIFICANT CHANGE UP
HCT VFR BLD CALC: 39.6 % — SIGNIFICANT CHANGE UP (ref 34.5–45)
HGB BLD-MCNC: 13.1 G/DL — SIGNIFICANT CHANGE UP (ref 11.5–15.5)
IMM GRANULOCYTES NFR BLD AUTO: 0.2 % — SIGNIFICANT CHANGE UP (ref 0–0.9)
KETONES UR-MCNC: ABNORMAL MG/DL
LEUKOCYTE ESTERASE UR-ACNC: ABNORMAL
LIDOCAIN IGE QN: 27 U/L — SIGNIFICANT CHANGE UP (ref 22–51)
LYMPHOCYTES # BLD AUTO: 2.6 K/UL — SIGNIFICANT CHANGE UP (ref 1–3.3)
LYMPHOCYTES # BLD AUTO: 29.1 % — SIGNIFICANT CHANGE UP (ref 13–44)
MCHC RBC-ENTMCNC: 29.2 PG — SIGNIFICANT CHANGE UP (ref 27–34)
MCHC RBC-ENTMCNC: 33.1 GM/DL — SIGNIFICANT CHANGE UP (ref 32–36)
MCV RBC AUTO: 88.4 FL — SIGNIFICANT CHANGE UP (ref 80–100)
MONOCYTES # BLD AUTO: 0.59 K/UL — SIGNIFICANT CHANGE UP (ref 0–0.9)
MONOCYTES NFR BLD AUTO: 6.6 % — SIGNIFICANT CHANGE UP (ref 2–14)
NEUTROPHILS # BLD AUTO: 5.44 K/UL — SIGNIFICANT CHANGE UP (ref 1.8–7.4)
NEUTROPHILS NFR BLD AUTO: 61.1 % — SIGNIFICANT CHANGE UP (ref 43–77)
NITRITE UR-MCNC: NEGATIVE — SIGNIFICANT CHANGE UP
PH UR: 6 — SIGNIFICANT CHANGE UP (ref 5–8)
PLATELET # BLD AUTO: 311 K/UL — SIGNIFICANT CHANGE UP (ref 150–400)
POTASSIUM SERPL-MCNC: 3.7 MMOL/L — SIGNIFICANT CHANGE UP (ref 3.5–5.3)
POTASSIUM SERPL-SCNC: 3.7 MMOL/L — SIGNIFICANT CHANGE UP (ref 3.5–5.3)
PROT SERPL-MCNC: 7.4 G/DL — SIGNIFICANT CHANGE UP (ref 6.6–8.7)
PROT UR-MCNC: 30 MG/DL
RBC # BLD: 4.48 M/UL — SIGNIFICANT CHANGE UP (ref 3.8–5.2)
RBC # FLD: 12.3 % — SIGNIFICANT CHANGE UP (ref 10.3–14.5)
RBC CASTS # UR COMP ASSIST: 1112 /HPF — HIGH (ref 0–4)
SODIUM SERPL-SCNC: 140 MMOL/L — SIGNIFICANT CHANGE UP (ref 135–145)
SP GR SPEC: 1.03 — SIGNIFICANT CHANGE UP (ref 1–1.03)
SQUAMOUS # UR AUTO: 16 /HPF — HIGH (ref 0–5)
UROBILINOGEN FLD QL: 1 MG/DL — SIGNIFICANT CHANGE UP (ref 0.2–1)
WBC # BLD: 8.92 K/UL — SIGNIFICANT CHANGE UP (ref 3.8–10.5)
WBC # FLD AUTO: 8.92 K/UL — SIGNIFICANT CHANGE UP (ref 3.8–10.5)
WBC UR QL: 3 /HPF — SIGNIFICANT CHANGE UP (ref 0–5)

## 2024-07-07 PROCEDURE — 36415 COLL VENOUS BLD VENIPUNCTURE: CPT

## 2024-07-07 PROCEDURE — 74177 CT ABD & PELVIS W/CONTRAST: CPT | Mod: MC

## 2024-07-07 PROCEDURE — 80053 COMPREHEN METABOLIC PANEL: CPT

## 2024-07-07 PROCEDURE — 99284 EMERGENCY DEPT VISIT MOD MDM: CPT | Mod: 25

## 2024-07-07 PROCEDURE — 85025 COMPLETE CBC W/AUTO DIFF WBC: CPT

## 2024-07-07 PROCEDURE — 87086 URINE CULTURE/COLONY COUNT: CPT

## 2024-07-07 PROCEDURE — 81001 URINALYSIS AUTO W/SCOPE: CPT

## 2024-07-07 PROCEDURE — 96374 THER/PROPH/DIAG INJ IV PUSH: CPT | Mod: XU

## 2024-07-07 PROCEDURE — 99285 EMERGENCY DEPT VISIT HI MDM: CPT

## 2024-07-07 PROCEDURE — 96375 TX/PRO/DX INJ NEW DRUG ADDON: CPT

## 2024-07-07 PROCEDURE — 74177 CT ABD & PELVIS W/CONTRAST: CPT | Mod: 26,MC

## 2024-07-07 PROCEDURE — 83690 ASSAY OF LIPASE: CPT

## 2024-07-07 RX ORDER — SODIUM CHLORIDE 0.9 % (FLUSH) 0.9 %
1000 SYRINGE (ML) INJECTION ONCE
Refills: 0 | Status: COMPLETED | OUTPATIENT
Start: 2024-07-07 | End: 2024-07-07

## 2024-07-07 RX ORDER — CEPHALEXIN 500 MG
1 CAPSULE ORAL
Qty: 14 | Refills: 0
Start: 2024-07-07 | End: 2024-07-13

## 2024-07-07 RX ORDER — ONDANSETRON HYDROCHLORIDE 2 MG/ML
4 INJECTION INTRAMUSCULAR; INTRAVENOUS ONCE
Refills: 0 | Status: COMPLETED | OUTPATIENT
Start: 2024-07-07 | End: 2024-07-07

## 2024-07-07 RX ORDER — ACETAMINOPHEN 325 MG
1000 TABLET ORAL ONCE
Refills: 0 | Status: COMPLETED | OUTPATIENT
Start: 2024-07-07 | End: 2024-07-07

## 2024-07-07 RX ORDER — TAMSULOSIN HYDROCHLORIDE 0.4 MG/1
1 CAPSULE ORAL
Qty: 7 | Refills: 0
Start: 2024-07-07 | End: 2024-07-13

## 2024-07-07 RX ORDER — ONDANSETRON HYDROCHLORIDE 2 MG/ML
1 INJECTION INTRAMUSCULAR; INTRAVENOUS
Qty: 1 | Refills: 0
Start: 2024-07-07 | End: 2024-07-11

## 2024-07-07 RX ORDER — CEPHALEXIN 500 MG
1 CAPSULE ORAL
Qty: 28 | Refills: 0
Start: 2024-07-07 | End: 2024-07-13

## 2024-07-07 RX ORDER — CEFTRIAXONE SODIUM 500 MG
1000 VIAL (EA) INJECTION ONCE
Refills: 0 | Status: COMPLETED | OUTPATIENT
Start: 2024-07-07 | End: 2024-07-07

## 2024-07-07 RX ORDER — MORPHINE SULFATE 100 MG/1
2 TABLET, EXTENDED RELEASE ORAL ONCE
Refills: 0 | Status: DISCONTINUED | OUTPATIENT
Start: 2024-07-07 | End: 2024-07-07

## 2024-07-07 RX ORDER — OXYCODONE AND ACETAMINOPHEN 5; 325 MG/1; MG/1
1 TABLET ORAL
Qty: 6 | Refills: 0
Start: 2024-07-07 | End: 2024-07-08

## 2024-07-07 RX ADMIN — Medication 400 MILLIGRAM(S): at 15:38

## 2024-07-07 RX ADMIN — MORPHINE SULFATE 2 MILLIGRAM(S): 100 TABLET, EXTENDED RELEASE ORAL at 15:41

## 2024-07-07 RX ADMIN — Medication 1000 MILLILITER(S): at 15:38

## 2024-07-07 RX ADMIN — Medication 1000 MILLIGRAM(S): at 21:01

## 2024-07-07 RX ADMIN — ONDANSETRON HYDROCHLORIDE 4 MILLIGRAM(S): 2 INJECTION INTRAMUSCULAR; INTRAVENOUS at 19:10

## 2024-07-08 LAB
CULTURE RESULTS: SIGNIFICANT CHANGE UP
SPECIMEN SOURCE: SIGNIFICANT CHANGE UP

## 2024-07-12 ENCOUNTER — APPOINTMENT (OUTPATIENT)
Dept: UROLOGY | Facility: CLINIC | Age: 54
End: 2024-07-12
Payer: MEDICAID

## 2024-07-12 VITALS
BODY MASS INDEX: 29.45 KG/M2 | HEART RATE: 83 BPM | HEIGHT: 60 IN | WEIGHT: 150 LBS | DIASTOLIC BLOOD PRESSURE: 83 MMHG | SYSTOLIC BLOOD PRESSURE: 132 MMHG

## 2024-07-12 DIAGNOSIS — N20.1 CALCULUS OF URETER: ICD-10-CM

## 2024-07-12 PROCEDURE — 99204 OFFICE O/P NEW MOD 45 MIN: CPT

## 2024-07-12 RX ORDER — TAMSULOSIN HYDROCHLORIDE 0.4 MG/1
0.4 CAPSULE ORAL
Qty: 90 | Refills: 3 | Status: ACTIVE | COMMUNITY
Start: 2024-07-12 | End: 1900-01-01

## 2024-07-16 ENCOUNTER — APPOINTMENT (OUTPATIENT)
Dept: UROLOGY | Facility: CLINIC | Age: 54
End: 2024-07-16

## 2024-08-07 ENCOUNTER — APPOINTMENT (OUTPATIENT)
Dept: CT IMAGING | Facility: CLINIC | Age: 54
End: 2024-08-07

## 2024-08-14 ENCOUNTER — APPOINTMENT (OUTPATIENT)
Dept: UROLOGY | Facility: CLINIC | Age: 54
End: 2024-08-14
Payer: MEDICAID

## 2024-08-14 ENCOUNTER — LABORATORY RESULT (OUTPATIENT)
Age: 54
End: 2024-08-14

## 2024-08-14 PROCEDURE — 99442: CPT

## 2024-09-20 ENCOUNTER — APPOINTMENT (OUTPATIENT)
Dept: ULTRASOUND IMAGING | Facility: CLINIC | Age: 54
End: 2024-09-20

## 2024-09-27 ENCOUNTER — APPOINTMENT (OUTPATIENT)
Dept: UROLOGY | Facility: CLINIC | Age: 54
End: 2024-09-27

## 2024-10-01 ENCOUNTER — APPOINTMENT (OUTPATIENT)
Dept: GASTROENTEROLOGY | Facility: CLINIC | Age: 54
End: 2024-10-01

## 2024-10-18 ENCOUNTER — OFFICE (OUTPATIENT)
Dept: URBAN - METROPOLITAN AREA CLINIC 94 | Facility: CLINIC | Age: 54
Setting detail: OPHTHALMOLOGY
End: 2024-10-18
Payer: MEDICAID

## 2024-10-18 DIAGNOSIS — H25.11: ICD-10-CM

## 2024-10-18 DIAGNOSIS — H04.123: ICD-10-CM

## 2024-10-18 DIAGNOSIS — H43.393: ICD-10-CM

## 2024-10-18 DIAGNOSIS — H40.003: ICD-10-CM

## 2024-10-18 DIAGNOSIS — H25.13: ICD-10-CM

## 2024-10-18 DIAGNOSIS — H40.033: ICD-10-CM

## 2024-10-18 DIAGNOSIS — H52.4: ICD-10-CM

## 2024-10-18 DIAGNOSIS — H25.12: ICD-10-CM

## 2024-10-18 DIAGNOSIS — H04.121: ICD-10-CM

## 2024-10-18 DIAGNOSIS — H04.122: ICD-10-CM

## 2024-10-18 PROCEDURE — 99213 OFFICE O/P EST LOW 20 MIN: CPT | Performed by: OPHTHALMOLOGY

## 2024-10-18 ASSESSMENT — TONOMETRY
OS_IOP_MMHG: 12
OD_IOP_MMHG: 11

## 2024-10-18 ASSESSMENT — KERATOMETRY
OD_K2POWER_DIOPTERS: 46.50
METHOD_AUTO_MANUAL: AUTO
OS_K1POWER_DIOPTERS: 45.75
OS_AXISANGLE_DEGREES: 083
OS_K2POWER_DIOPTERS: 46.25
OD_K1POWER_DIOPTERS: 46.25
OD_AXISANGLE_DEGREES: 119

## 2024-10-18 ASSESSMENT — REFRACTION_AUTOREFRACTION
OS_AXIS: 046
OS_CYLINDER: -0.25
OD_CYLINDER: -0.25
OS_SPHERE: +1.25
OD_SPHERE: +1.25
OD_AXIS: 053

## 2024-10-18 ASSESSMENT — VISUAL ACUITY
OS_BCVA: 20/25
OD_BCVA: 20/25-

## 2025-01-27 ENCOUNTER — APPOINTMENT (OUTPATIENT)
Dept: CARDIOLOGY | Facility: CLINIC | Age: 55
End: 2025-01-27

## 2025-04-21 ENCOUNTER — OFFICE (OUTPATIENT)
Dept: URBAN - METROPOLITAN AREA CLINIC 94 | Facility: CLINIC | Age: 55
Setting detail: OPHTHALMOLOGY
End: 2025-04-21
Payer: MEDICAID

## 2025-04-21 DIAGNOSIS — H40.033: ICD-10-CM

## 2025-04-21 DIAGNOSIS — H25.13: ICD-10-CM

## 2025-04-21 DIAGNOSIS — H04.123: ICD-10-CM

## 2025-04-21 DIAGNOSIS — H43.393: ICD-10-CM

## 2025-04-21 PROCEDURE — 99213 OFFICE O/P EST LOW 20 MIN: CPT | Performed by: OPHTHALMOLOGY

## 2025-04-21 ASSESSMENT — KERATOMETRY
OD_AXISANGLE_DEGREES: 106
OD_K1POWER_DIOPTERS: 45.50
OS_K2POWER_DIOPTERS: 46.50
OS_K1POWER_DIOPTERS: 45.50
METHOD_AUTO_MANUAL: AUTO
OD_K2POWER_DIOPTERS: 46.50
OS_AXISANGLE_DEGREES: 080

## 2025-04-21 ASSESSMENT — TONOMETRY
OD_IOP_MMHG: 13
OS_IOP_MMHG: 14

## 2025-04-21 ASSESSMENT — REFRACTION_AUTOREFRACTION
OS_CYLINDER: -0.25
OS_AXIS: 015
OS_SPHERE: +1.50
OD_AXIS: 022
OD_SPHERE: +1.75
OD_CYLINDER: -0.50

## 2025-04-21 ASSESSMENT — CONFRONTATIONAL VISUAL FIELD TEST (CVF)
OD_FINDINGS: FULL
OS_FINDINGS: FULL

## 2025-04-21 ASSESSMENT — VISUAL ACUITY
OD_BCVA: 20/30
OS_BCVA: 20/30